# Patient Record
Sex: FEMALE | NOT HISPANIC OR LATINO | Employment: OTHER | URBAN - METROPOLITAN AREA
[De-identification: names, ages, dates, MRNs, and addresses within clinical notes are randomized per-mention and may not be internally consistent; named-entity substitution may affect disease eponyms.]

---

## 2024-06-07 ENCOUNTER — HOSPITAL ENCOUNTER (EMERGENCY)
Facility: HOSPITAL | Age: 79
Discharge: HOME | End: 2024-06-07
Attending: EMERGENCY MEDICINE
Payer: COMMERCIAL

## 2024-06-07 ENCOUNTER — APPOINTMENT (EMERGENCY)
Dept: CT IMAGING | Facility: HOSPITAL | Age: 79
End: 2024-06-07
Payer: COMMERCIAL

## 2024-06-07 VITALS
RESPIRATION RATE: 16 BRPM | HEART RATE: 60 BPM | DIASTOLIC BLOOD PRESSURE: 58 MMHG | OXYGEN SATURATION: 98 % | TEMPERATURE: 97.9 F | SYSTOLIC BLOOD PRESSURE: 128 MMHG

## 2024-06-07 DIAGNOSIS — M54.81 OCCIPITAL NEURALGIA OF RIGHT SIDE: Primary | ICD-10-CM

## 2024-06-07 LAB
ALBUMIN SERPL BCP-MCNC: 4.1 G/DL (ref 3.5–5)
ALP SERPL-CCNC: 57 U/L (ref 34–104)
ALT SERPL W P-5'-P-CCNC: 5 U/L (ref 7–52)
ANION GAP SERPL CALCULATED.3IONS-SCNC: 5 MMOL/L (ref 4–13)
AST SERPL W P-5'-P-CCNC: 13 U/L (ref 13–39)
BILIRUB DIRECT SERPL-MCNC: 0.04 MG/DL (ref 0–0.2)
BILIRUB SERPL-MCNC: 0.32 MG/DL (ref 0.2–1)
BUN SERPL-MCNC: 19 MG/DL (ref 5–25)
CALCIUM SERPL-MCNC: 8.9 MG/DL (ref 8.4–10.2)
CHLORIDE SERPL-SCNC: 108 MMOL/L (ref 96–108)
CO2 SERPL-SCNC: 23 MMOL/L (ref 21–32)
CREAT SERPL-MCNC: 0.78 MG/DL (ref 0.6–1.3)
GFR SERPL CREATININE-BSD FRML MDRD: 74 ML/MIN/1.73SQ M
GLUCOSE SERPL-MCNC: 102 MG/DL (ref 65–140)
POTASSIUM SERPL-SCNC: 4.1 MMOL/L (ref 3.5–5.3)
PROT SERPL-MCNC: 7.3 G/DL (ref 6.4–8.4)
SODIUM SERPL-SCNC: 136 MMOL/L (ref 135–147)

## 2024-06-07 PROCEDURE — 99284 EMERGENCY DEPT VISIT MOD MDM: CPT | Performed by: EMERGENCY MEDICINE

## 2024-06-07 PROCEDURE — 96375 TX/PRO/DX INJ NEW DRUG ADDON: CPT

## 2024-06-07 PROCEDURE — 99283 EMERGENCY DEPT VISIT LOW MDM: CPT

## 2024-06-07 PROCEDURE — 80076 HEPATIC FUNCTION PANEL: CPT

## 2024-06-07 PROCEDURE — 36415 COLL VENOUS BLD VENIPUNCTURE: CPT

## 2024-06-07 PROCEDURE — 96365 THER/PROPH/DIAG IV INF INIT: CPT

## 2024-06-07 PROCEDURE — 70450 CT HEAD/BRAIN W/O DYE: CPT

## 2024-06-07 PROCEDURE — 80048 BASIC METABOLIC PNL TOTAL CA: CPT

## 2024-06-07 RX ORDER — CARBAMAZEPINE 100 MG/1
200 TABLET, EXTENDED RELEASE ORAL 2 TIMES DAILY
Qty: 26 TABLET | Refills: 0 | Status: SHIPPED | OUTPATIENT
Start: 2024-06-07 | End: 2024-06-14

## 2024-06-07 RX ORDER — MAGNESIUM SULFATE HEPTAHYDRATE 40 MG/ML
2 INJECTION, SOLUTION INTRAVENOUS ONCE
Status: COMPLETED | OUTPATIENT
Start: 2024-06-07 | End: 2024-06-07

## 2024-06-07 RX ORDER — CARBAMAZEPINE 200 MG/1
200 TABLET ORAL ONCE
Status: COMPLETED | OUTPATIENT
Start: 2024-06-07 | End: 2024-06-07

## 2024-06-07 RX ORDER — KETOROLAC TROMETHAMINE 30 MG/ML
15 INJECTION, SOLUTION INTRAMUSCULAR; INTRAVENOUS ONCE
Status: COMPLETED | OUTPATIENT
Start: 2024-06-07 | End: 2024-06-07

## 2024-06-07 RX ORDER — METOCLOPRAMIDE HYDROCHLORIDE 5 MG/ML
10 INJECTION INTRAMUSCULAR; INTRAVENOUS ONCE
Status: COMPLETED | OUTPATIENT
Start: 2024-06-07 | End: 2024-06-07

## 2024-06-07 RX ADMIN — METOCLOPRAMIDE 10 MG: 5 INJECTION, SOLUTION INTRAMUSCULAR; INTRAVENOUS at 17:52

## 2024-06-07 RX ADMIN — SODIUM CHLORIDE 1000 ML: 0.9 INJECTION, SOLUTION INTRAVENOUS at 17:52

## 2024-06-07 RX ADMIN — KETOROLAC TROMETHAMINE 15 MG: 30 INJECTION, SOLUTION INTRAMUSCULAR; INTRAVENOUS at 17:54

## 2024-06-07 RX ADMIN — CARBAMAZEPINE 200 MG: 200 TABLET ORAL at 20:43

## 2024-06-07 RX ADMIN — MAGNESIUM SULFATE HEPTAHYDRATE 2 G: 40 INJECTION, SOLUTION INTRAVENOUS at 17:57

## 2024-06-07 NOTE — ED PROVIDER NOTES
"History  Chief Complaint   Patient presents with    Neck Pain     Pt c/o of a neck pain for three days that is off and on now pt presents with neck pain that shoots up to the right side of her head. No head trauma.      HPI    (Crys Bustos) Crys Bustos is a 78 y.o. female who identifies as female presents to the emergency department on June 8, 2024 for headache onset 3 days ago.   Pain is intermittent, progressively worsened over the past 3 days, described as \"electrical, shooting\", located in R back of head, with radiation towards ear and down towards R neck, rated 10/10. No reported aggravating or alleviating factors. Took Tylenol and Motrin without relief. No fall, trauma, sudden turning of head, or chiropractic manipulation. Patient denies vision changes, photophobia, chest pain, SOB, nausea, vomiting, abd pain, weakness/numbness of extremities, or any other complaint at this time.      Allergies include:  No Known Allergies      Immunizations:    There is no immunization history on file for this patient.  Immunizations Reviewed.    None       History reviewed. No pertinent past medical history.    History reviewed. No pertinent surgical history.    History reviewed. No pertinent family history.  I have reviewed and agree with the history as documented.    E-Cigarette/Vaping     E-Cigarette/Vaping Substances     Social History     Tobacco Use    Smoking status: Never    Smokeless tobacco: Never   Substance Use Topics    Alcohol use: Not Currently    Drug use: Not Currently        Review of Systems   Constitutional:  Negative for activity change, fever and unexpected weight change.   Respiratory:  Negative for cough, chest tightness and shortness of breath.    Cardiovascular:  Negative for chest pain and palpitations.   Gastrointestinal:  Negative for abdominal pain, diarrhea, nausea and vomiting.   Genitourinary:  Negative for dysuria and hematuria.   Skin:  Negative for wound.   Allergic/Immunologic: " Negative for immunocompromised state.   Neurological:  Positive for headaches. Negative for syncope.   All other systems reviewed and are negative.      Physical Exam  ED Triage Vitals [06/07/24 1643]   Temperature Pulse Respirations Blood Pressure SpO2   97.9 °F (36.6 °C) 79 20 (!) 179/80 100 %      Temp Source Heart Rate Source Patient Position - Orthostatic VS BP Location FiO2 (%)   Oral Monitor Sitting Right arm --      Pain Score       9             Orthostatic Vital Signs  Vitals:    06/07/24 1643 06/07/24 2043   BP: (!) 179/80 128/58   Pulse: 79 60   Patient Position - Orthostatic VS: Sitting Lying       Physical Exam  Vitals and nursing note reviewed.   Constitutional:       Appearance: She is not toxic-appearing or diaphoretic.   HENT:      Head: Normocephalic and atraumatic.        Comments: Pain originates in the right occiput.  No reproducible tenderness with palpation of head or neck.  No tenderness with tapping of the mastoid bone.     Right Ear: Tympanic membrane, ear canal and external ear normal.      Left Ear: Tympanic membrane, ear canal and external ear normal.      Nose: Nose normal.      Mouth/Throat:      Mouth: Mucous membranes are moist.   Eyes:      General: No scleral icterus.     Extraocular Movements: Extraocular movements intact.      Conjunctiva/sclera: Conjunctivae normal.   Cardiovascular:      Rate and Rhythm: Normal rate and regular rhythm.      Pulses: Normal pulses.           Radial pulses are 2+ on the right side.        Dorsalis pedis pulses are 2+ on the right side and 2+ on the left side.      Heart sounds: Normal heart sounds, S1 normal and S2 normal. No murmur heard.  Pulmonary:      Effort: Pulmonary effort is normal. No respiratory distress.      Breath sounds: Normal breath sounds. No stridor. No wheezing.   Abdominal:      Palpations: Abdomen is soft.      Tenderness: There is no abdominal tenderness. There is no guarding or rebound.   Musculoskeletal:         General:  Normal range of motion.      Cervical back: Normal range of motion.   Skin:     General: Skin is warm and dry.   Neurological:      General: No focal deficit present.      Mental Status: She is alert and oriented to person, place, and time.      Comments: No visual field deficits, denies changes in visual acuity  Pupils PERRLA, EOM intact  Sensation intact and equal in upper, middle, and lower face  Able to open mouth fully, no TMJ tenderness, able to puff out cheeks, able keep eyes closed through tension  No facial droop or dysarthria  Able to hear finger rub equally b/l  Able to stick out tongue and move in both directions  No uvular deviation  Able to turn head and shrug shoulders against resistance  Ambulatory in the ER     Psychiatric:         Mood and Affect: Mood normal.         ED Medications  Medications   ketorolac (TORADOL) injection 15 mg (15 mg Intravenous Given 6/7/24 1754)   metoclopramide (REGLAN) injection 10 mg (10 mg Intravenous Given 6/7/24 1752)   magnesium sulfate 2 g/50 mL IVPB (premix) 2 g (0 g Intravenous Stopped 6/7/24 1900)   sodium chloride 0.9 % bolus 1,000 mL (0 mL Intravenous Stopped 6/7/24 1900)   carBAMazepine (TEGretol) tablet 200 mg (200 mg Oral Given 6/7/24 2043)       Diagnostic Studies  Results Reviewed       Procedure Component Value Units Date/Time    Basic metabolic panel [[id number]] Collected: 06/07/24 1949    Lab Status: Final result Specimen: Blood from Arm, Right Updated: 06/07/24 2019     Sodium 136 mmol/L      Potassium 4.1 mmol/L      Chloride 108 mmol/L      CO2 23 mmol/L      ANION GAP 5 mmol/L      BUN 19 mg/dL      Creatinine 0.78 mg/dL      Glucose 102 mg/dL      Calcium 8.9 mg/dL      eGFR 74 ml/min/1.73sq m     Narrative:      [employer] guidelines for Chronic Kidney Disease (CKD):     Stage 1 with normal or high GFR (GFR > 90 mL/min/1.73 square meters)    Stage 2 Mild CKD (GFR = 60-89 mL/min/1.73 square meters)    Stage 3A Moderate CKD  (GFR = 45-59 mL/min/1.73 square meters)    Stage 3B Moderate CKD (GFR = 30-44 mL/min/1.73 square meters)    Stage 4 Severe CKD (GFR = 15-29 mL/min/1.73 square meters)    Stage 5 End Stage CKD (GFR <15 mL/min/1.73 square meters)  Note: GFR calculation is accurate only with a steady state creatinine    Hepatic function panel [[id number]]  (Abnormal) Collected: 06/07/24 1949    Lab Status: Final result Specimen: Blood from Arm, Right Updated: 06/07/24 2019     Total Bilirubin 0.32 mg/dL      Bilirubin, Direct 0.04 mg/dL      Alkaline Phosphatase 57 U/L      AST 13 U/L      ALT 5 U/L      Total Protein 7.3 g/dL      Albumin 4.1 g/dL                    CT head wo contrast   Final Result by Devin Riojas MD (06/07 2003)      No acute intracranial hemorrhage, mass effect or edema.                  Workstation performed: [id number]               Procedures  Procedures      ED Course  ED Course as of 06/08/24 0128   Fri Jun 07, 2024 1650 DDx includes but not limited to:  migraine, mastoiditis, otitis media, occipital neuralgia, CVA   1829 Re-evaluated patient, pain is improved     1951 Re-evaluated patient, is much more comfortable. Will send basic labs to evaluate kidney and renal function and start carbamazepine. Updated family, pending CT head read   2012 CT head wo contrast  No acute intracranial hemorrhage, mass effect or edema.   2015 Reached out to lab regarding labs.    2043 Hepatic function panel(!)  WNL   2044 Creatinine: 0.78  WNL   2045 Reevaluated patient, patient's pain is much improved and would like to go home.    Suspect patient's symptoms are due to occipital neuralgia.  Patient's pain improved with a migraine cocktail.  Will start patient on carbamazepine and have her follow-up with neurology and pain management closely.  Return to ER pre cautions discussed.  Patient and family voiced understanding and agree with plan.  All questions and concerns addressed at this time.                              SBIRT 20yo+      Flowsheet Row Most Recent Value   Initial Alcohol Screen: US AUDIT-C     1. How often do you have a drink containing alcohol? 0 Filed at: 06/07/2024 1645   3b. FEMALE Any Age, or MALE 65+: How often do you have 4 or more drinks on one occassion? 0 Filed at: 06/07/2024 1645   Audit-C Score 0 Filed at: 06/07/2024 1645   JUDY: How many times in the past year have you...    Used an illegal drug or used a prescription medication for non-medical reasons? Never Filed at: 06/07/2024 1645                  Medical Decision Making  Amount and/or Complexity of Data Reviewed  Labs: ordered.  Radiology: ordered. Decision-making details documented in ED Course.    Risk  Prescription drug management.        See ED course for MDM.    Disposition  Final diagnoses:   Occipital neuralgia of right side     Time reflects when diagnosis was documented in both MDM as applicable and the Disposition within this note       Time User Action Codes Description Comment    6/7/2024  7:36 PM Venita Remy Add [M54.81] Occipital neuralgia of right side           ED Disposition       ED Disposition   Discharge    Condition   Stable    Date/Time   Fri Jun 7, 2024 2042    Comment   Crys Bustos discharge to home/self care.                   Follow-up Information    None         Discharge Medication List as of 6/7/2024  8:43 PM        START taking these medications    Details   carBAMazepine (TEGretol-XR) 100 mg 12 hr tablet Take 2 tablets (200 mg total) by mouth 2 (two) times a day for 13 doses May take up to 2 tablets twice a day (200 mg twice a day) for trigeminal neuralgia pain, Starting Fri 6/7/2024, Until Fri 6/14/2024, Print               PDMP Review       None             ED Provider  Attending physically available and evaluated Crys Bustos. I managed the patient along with the ED Attending.    Electronically Signed by           Venita Remy MD  06/08/24 0120

## 2024-06-09 NOTE — ED ATTENDING ATTESTATION
6/7/2024  I, Ria Shell MD, saw and evaluated the patient. I have discussed the patient with the resident/non-physician practitioner and agree with the resident's/non-physician practitioner's findings, Plan of Care, and MDM as documented in the resident's/non-physician practitioner's note, except where noted. All available labs and Radiology studies were reviewed.  I was present for key portions of any procedure(s) performed by the resident/non-physician practitioner and I was immediately available to provide assistance.       At this point I agree with the current assessment done in the Emergency Department.  I have conducted an independent evaluation of this patient a history and physical is as follows:      79 yo female p/w intermittent shotting pain to left occiput.  Not worse with movement or palpation.  No sudden onset, no obvious trigger or inciting event or exacerbating/alleviating factors.  Denies h/o similar, or significant HA history.  Vision/speech/motor and balance are all at baseline.  One exam pt well appearing, neuro intact, no ROM limitations of neck, no midline TTP.  Pain not reproducible with palpation, symptoms suggestive of occipital neuralgia.  Will treat as such as refer pt to neuro and pain management for potential nerve block.  Low threshold to RTER for severe or intractable pain.        ED Course  ED Course as of 06/09/24 0113   Fri Jun 07, 2024 2012 CT head wo contrast  IMPRESSION:     No acute intracranial hemorrhage, mass effect or edema.                 Workstation performed: [id number]           Exam Ended: 06/07/24 18:31           2028 Hepatic function panel(!)  wnl   2028 Basic metabolic panel  wnl   2047 Dr. Shell Note - Pt well appearing, neuro intact, no ROM limitations of neck, no midline TTP.  Pain not reproducible with palpation, symptoms suggestive of occipital neuralgia.  Will treat as such as refer pt to neuro and pain management for potential nerve block.  Low  threshold to RTER for severe or intractable pain.           Critical Care Time  Procedures

## 2024-06-21 ENCOUNTER — TELEPHONE (OUTPATIENT)
Dept: NEUROLOGY | Facility: CLINIC | Age: 79
End: 2024-06-21

## 2024-06-21 NOTE — TELEPHONE ENCOUNTER
Called patients family friend (Celia) as per notes to schedule a new patient delmy. LVM to call back .   Thank you